# Patient Record
Sex: FEMALE | Race: WHITE | URBAN - METROPOLITAN AREA
[De-identification: names, ages, dates, MRNs, and addresses within clinical notes are randomized per-mention and may not be internally consistent; named-entity substitution may affect disease eponyms.]

---

## 2021-11-20 ENCOUNTER — EMERGENCY (EMERGENCY)
Facility: HOSPITAL | Age: 76
LOS: 0 days | Discharge: HOME | End: 2021-11-20
Attending: STUDENT IN AN ORGANIZED HEALTH CARE EDUCATION/TRAINING PROGRAM
Payer: MEDICARE

## 2021-11-20 VITALS
RESPIRATION RATE: 17 BRPM | SYSTOLIC BLOOD PRESSURE: 151 MMHG | TEMPERATURE: 97 F | HEART RATE: 70 BPM | DIASTOLIC BLOOD PRESSURE: 70 MMHG | OXYGEN SATURATION: 100 %

## 2021-11-20 VITALS — WEIGHT: 169.98 LBS | HEIGHT: 66 IN

## 2021-11-20 DIAGNOSIS — Z88.8 ALLERGY STATUS TO OTHER DRUGS, MEDICAMENTS AND BIOLOGICAL SUBSTANCES STATUS: ICD-10-CM
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
## 2021-11-20 DIAGNOSIS — T78.40XA ALLERGY, UNSPECIFIED, INITIAL ENCOUNTER: ICD-10-CM
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
## 2021-11-20 DIAGNOSIS — E78.5 HYPERLIPIDEMIA, UNSPECIFIED: ICD-10-CM

## 2021-11-20 DIAGNOSIS — R55 SYNCOPE AND COLLAPSE: ICD-10-CM

## 2021-11-20 DIAGNOSIS — I10 ESSENTIAL (PRIMARY) HYPERTENSION: ICD-10-CM

## 2021-11-20 LAB
ALBUMIN SERPL ELPH-MCNC: 4 G/DL — SIGNIFICANT CHANGE UP (ref 3.3–5)
ALP SERPL-CCNC: 83 U/L — SIGNIFICANT CHANGE UP (ref 40–120)
ALT FLD-CCNC: 34 U/L — SIGNIFICANT CHANGE UP (ref 12–78)
ANION GAP SERPL CALC-SCNC: 6 MMOL/L — SIGNIFICANT CHANGE UP (ref 5–17)
AST SERPL-CCNC: 30 U/L — SIGNIFICANT CHANGE UP (ref 15–37)
BASOPHILS # BLD AUTO: 0.04 K/UL — SIGNIFICANT CHANGE UP (ref 0–0.2)
BASOPHILS NFR BLD AUTO: 0.5 % — SIGNIFICANT CHANGE UP (ref 0–2)
BILIRUB SERPL-MCNC: 0.7 MG/DL — SIGNIFICANT CHANGE UP (ref 0.2–1.2)
BUN SERPL-MCNC: 16 MG/DL — SIGNIFICANT CHANGE UP (ref 7–23)
CALCIUM SERPL-MCNC: 9.6 MG/DL — SIGNIFICANT CHANGE UP (ref 8.5–10.1)
CHLORIDE SERPL-SCNC: 105 MMOL/L — SIGNIFICANT CHANGE UP (ref 96–108)
CO2 SERPL-SCNC: 24 MMOL/L — SIGNIFICANT CHANGE UP (ref 22–31)
CREAT SERPL-MCNC: 0.65 MG/DL — SIGNIFICANT CHANGE UP (ref 0.5–1.3)
EOSINOPHIL # BLD AUTO: 0.27 K/UL — SIGNIFICANT CHANGE UP (ref 0–0.5)
EOSINOPHIL NFR BLD AUTO: 3.2 % — SIGNIFICANT CHANGE UP (ref 0–6)
GLUCOSE SERPL-MCNC: 100 MG/DL — HIGH (ref 70–99)
HCT VFR BLD CALC: 41.1 % — SIGNIFICANT CHANGE UP (ref 34.5–45)
HGB BLD-MCNC: 14.1 G/DL — SIGNIFICANT CHANGE UP (ref 11.5–15.5)
IMM GRANULOCYTES NFR BLD AUTO: 0.2 % — SIGNIFICANT CHANGE UP (ref 0–1.5)
LYMPHOCYTES # BLD AUTO: 1.51 K/UL — SIGNIFICANT CHANGE UP (ref 1–3.3)
LYMPHOCYTES # BLD AUTO: 17.8 % — SIGNIFICANT CHANGE UP (ref 13–44)
MCHC RBC-ENTMCNC: 30.3 PG — SIGNIFICANT CHANGE UP (ref 27–34)
MCHC RBC-ENTMCNC: 34.3 GM/DL — SIGNIFICANT CHANGE UP (ref 32–36)
MCV RBC AUTO: 88.4 FL — SIGNIFICANT CHANGE UP (ref 80–100)
MONOCYTES # BLD AUTO: 0.67 K/UL — SIGNIFICANT CHANGE UP (ref 0–0.9)
MONOCYTES NFR BLD AUTO: 7.9 % — SIGNIFICANT CHANGE UP (ref 2–14)
NEUTROPHILS # BLD AUTO: 5.99 K/UL — SIGNIFICANT CHANGE UP (ref 1.8–7.4)
NEUTROPHILS NFR BLD AUTO: 70.4 % — SIGNIFICANT CHANGE UP (ref 43–77)
PLATELET # BLD AUTO: 256 K/UL — SIGNIFICANT CHANGE UP (ref 150–400)
POTASSIUM SERPL-MCNC: 3.6 MMOL/L — SIGNIFICANT CHANGE UP (ref 3.5–5.3)
POTASSIUM SERPL-SCNC: 3.6 MMOL/L — SIGNIFICANT CHANGE UP (ref 3.5–5.3)
PROT SERPL-MCNC: 7.2 GM/DL — SIGNIFICANT CHANGE UP (ref 6–8.3)
RBC # BLD: 4.65 M/UL — SIGNIFICANT CHANGE UP (ref 3.8–5.2)
RBC # FLD: 12.5 % — SIGNIFICANT CHANGE UP (ref 10.3–14.5)
SODIUM SERPL-SCNC: 135 MMOL/L — SIGNIFICANT CHANGE UP (ref 135–145)
TROPONIN I, HIGH SENSITIVITY RESULT: 7.53 NG/L — SIGNIFICANT CHANGE UP
WBC # BLD: 8.5 K/UL — SIGNIFICANT CHANGE UP (ref 3.8–10.5)
WBC # FLD AUTO: 8.5 K/UL — SIGNIFICANT CHANGE UP (ref 3.8–10.5)

## 2021-11-20 PROCEDURE — 99283 EMERGENCY DEPT VISIT LOW MDM: CPT

## 2021-11-20 PROCEDURE — 80053 COMPREHEN METABOLIC PANEL: CPT

## 2021-11-20 PROCEDURE — 85025 COMPLETE CBC W/AUTO DIFF WBC: CPT

## 2021-11-20 PROCEDURE — 93005 ELECTROCARDIOGRAM TRACING: CPT

## 2021-11-20 PROCEDURE — 93010 ELECTROCARDIOGRAM REPORT: CPT

## 2021-11-20 PROCEDURE — 84484 ASSAY OF TROPONIN QUANT: CPT

## 2021-11-20 PROCEDURE — 99285 EMERGENCY DEPT VISIT HI MDM: CPT

## 2021-11-20 PROCEDURE — 36415 COLL VENOUS BLD VENIPUNCTURE: CPT

## 2021-11-20 NOTE — ED STATDOCS - PATIENT PORTAL LINK FT
You can access the FollowMyHealth Patient Portal offered by [facility] by registering at the following website: [email or web address]. By joining [employer]’s FollowMyHealth portal, you will also be able to view your health information using other applications (apps) compatible with our system.

## 2021-11-20 NOTE — ED STATDOCS - NSFOLLOWUPINSTRUCTIONS_ED_ALL_ED_FT
GENERAL ALLERGIC REACTION - Ambulatory Care           General Allergic Reaction    AMBULATORY CARE:    An allergic reaction is your body's response to an allergen. Allergens include medicines, food, insect stings, animal dander, mold, latex, chemicals, and dust mites. Pollen from trees, grass, and weeds can also cause an allergic reaction. An allergic reaction can range from mild to severe.    Common signs and symptoms:   •Sneezing and a runny, itchy, or stuffy nose      •Swollen, watery, or itchy eyes      •Mild or severe skin itching or swelling      •Swelling or pain where an insect bit or stung you      •Trouble breathing or swallowing, a cough, or wheezing      •A rash or hives      •Feeling lightheaded or dizzy      Call 911 for signs or symptoms of anaphylaxis, such as trouble breathing, swelling in your mouth or throat, or wheezing. You may also have itching, a rash, hives, or feel like you are going to faint.    Seek care immediately if:   •You have a skin rash, hives, swelling, or itching that is starting to get worse.      •Your throat tightens, or your lips or tongue swell.      •You have trouble swallowing or speaking.      •You have worsening nausea, diarrhea, or abdominal cramps, or you are vomiting.      •You have chest pain or tightness.      Contact your healthcare provider if:   •You have questions or concerns about your condition or care.          Treatment for a general allergic reaction may include medicines to relieve certain allergy symptoms such as itching, sneezing, and swelling. You may take them as a pill or use drops in your nose or eyes. Topical treatments may be given to put directly on your skin to help decrease itching or swelling. Epinephrine may be prescribed if you are at risk for anaphylaxis. This is a severe allergic reaction that can be life-threatening. Your healthcare provider will tell you if you need to keep epinephrine with you. You will be taught when and how to use it.    Manage your symptoms:   •Avoid allergens. You may need to have allergy testing with your healthcare provider or a specialist to find your allergens.      •Use cold compresses on your skin or eyes. This will help soothe skin or eyes affected by the allergic reaction. You can make a cold compress by soaking a washcloth in cool water. Wring out the extra water before you apply the washcloth.      •Rinse your nasal passages with a saline solution. Daily rinsing may help clear allergens out of your nose. Use distilled water if possible. You can also boil tap water and then let it cool before you use it. Do not use tap water without boiling it first.      •Do not smoke. Nicotine and other chemicals in cigarettes and cigars can make an allergic reaction worse, and can also cause lung damage. Ask your healthcare provider for information if you currently smoke and need help to quit. E-cigarettes or smokeless tobacco still contain nicotine. Talk to your healthcare provider before you use these products.       Follow up with your doctor as directed: Write down your questions so you remember to ask them during your visits.

## 2021-11-20 NOTE — ED STATDOCS - ATTENDING CONTRIBUTION TO CARE
I, Gina Amaral DO,  performed the initial face to face bedside interview with this patient regarding history of present illness, review of symptoms and relevant past medical, social and family history.  I completed an independent physical examination.  I was the initial provider who evaluated this patient. I have signed out the follow up of any pending tests (i.e. labs, radiological studies) to the ACP.  I have communicated the patient’s plan of care and disposition with the ACP.  The history, relevant review of systems, past medical and surgical history, medical decision making, and physical examination was documented by the scribe in my presence and I attest to the accuracy of the documentation.

## 2021-11-20 NOTE — ED ADULT NURSE NOTE - OBJECTIVE STATEMENT
pt presents to the ED for allergic reaction. Pt reports she went out to dinner last night and after she had a burning sensation throughout her body. Pt states she lips and tongue were swollen. Pt became diaphoretic and thinks she passed out but is not sure. Pt had a HA at that time, now improved. Pt reports she had a burning sensation throughout her body a month ago after going out to dinner. Pt currently feels back to baseline.

## 2021-11-20 NOTE — ED STATDOCS - PROGRESS NOTE DETAILS
PA: Patient is a 77 y/o female with PMHx of HTN, HLD who presents to [facility] for possible allergic reaction. Pt reports she went out to dinner last night and after she had a burning sensation throughout her body. Pt states she lips and tongue were swollen. Pt became diaphoretic and thinks she passed out but she is not sure. Pt had a HA at that time, now improved. Pt reports she had a burning sensation throughout her body a month ago after going out to dinner. Pt currently feels back to baseline. Denies rash. No other complaints at this time. ~Kristian Lee PA-C PA note: All labwork and studies reviewed in details with patient. Patient re-examined and re-evaluated. Patient feels much better at this time. ED evaluation, Diagnosis and management discussed with the patient in detail. Workup results discussed with ED attending, OK to [place] home. Close PMD follow up encouraged.  Strict ED return instructions discussed in detail and patient given the opportunity to ask any questions about their discharge diagnosis and instructions. Patient verbalized understanding. ~ Kristian Lee PA-C

## 2021-11-20 NOTE — ED STATDOCS - OBJECTIVE STATEMENT
77 y/o female with a PMHx of HTN, HLD presents to the ED for allergic reaction. Pt reports she went out to dinner last night and after she had a burning sensation throughout her body. Pt states she lips and tongue were swollen. Pt became diaphoretic and thinks she passed out but is not sure. Pt had a HA at that time, now improved. Pt reports she had a burning sensation throughout her body a month ago after going out to dinner. Pt currently feels back to baseline. Denies rash. No other complaints at this time.

## 2021-11-20 NOTE — ED STATDOCS - CLINICAL SUMMARY MEDICAL DECISION MAKING FREE TEXT BOX
75 y/o female with dizziness and syncope s/p allergic reaction. Plan: EKG, basic labs, reeval. 77 y/o female with dizziness and syncope s/p allergic reaction. Plan: EKG, basic labs, reeval.    PA note: All labwork and studies reviewed in details with patient. Patient re-examined and re-evaluated. Patient feels much better at this time. ED evaluation, Diagnosis and management discussed with the patient in detail. Workup results discussed with ED attending, OK to [place] home. Close PMD follow up encouraged.  Strict ED return instructions discussed in detail and patient given the opportunity to ask any questions about their discharge diagnosis and instructions. Patient verbalized understanding. ~ Kristian Lee PA-C

## 2021-11-20 NOTE — ED STATDOCS - PHYSICAL EXAMINATION
PA NOTE: GEN: AOX3, NAD. HEENT: Throat clear. Airway is patent. EYES: PERRLA. EOMI. Head: NC/AT. NECK: Supple, No JVD. FROM. C-spine non-tender. CV:S1S2, RRR, LUNGS: Non-labored breathing, no tachypnea. O2sat 100% RA. CTA b/l. No w/r/r. CHEST: Equal chest expansion and rise. No deformity. ABD: Soft, NT/ND, no rebound, no guarding. No CVAT. EXT: No e/c/c. 2+ distal pulses. SKIN: No rashes. NEURO: No focal deficits. CN II-XII intact. FROM. 5/5 motor and sensory. ~Kristian Lee PA-C

## 2021-11-20 NOTE — ED ADULT TRIAGE NOTE - CHIEF COMPLAINT QUOTE
Pt presents to er with complaints of eating something last night and having an allergic reaction, states it is the second time this has happened in the past month and states she ate out both times, pt reports feeling dizzy today, states last night her tongue swelled up and was diaphoretic  and feels better at this time, pt speaking in clear/full sentences and ambulating with steady gait, respirations unlabored, pulse-[id number] in pivot.

## 2021-12-15 PROBLEM — I10 ESSENTIAL (PRIMARY) HYPERTENSION: Chronic | Status: ACTIVE | Noted: 2021-11-20

## 2021-12-15 PROBLEM — E78.5 HYPERLIPIDEMIA, UNSPECIFIED: Chronic | Status: ACTIVE | Noted: 2021-11-20

## 2022-02-03 ENCOUNTER — APPOINTMENT (OUTPATIENT)
Dept: PEDIATRIC ALLERGY IMMUNOLOGY | Facility: CLINIC | Age: 77
End: 2022-02-03
Payer: MEDICARE

## 2022-02-03 VITALS — OXYGEN SATURATION: 100 % | TEMPERATURE: 97.4 F | RESPIRATION RATE: 16 BRPM | HEART RATE: 63 BPM

## 2022-02-03 PROCEDURE — 99205 OFFICE O/P NEW HI 60 MIN: CPT

## 2022-02-03 RX ORDER — OMEPRAZOLE 20 MG/1
20 CAPSULE, DELAYED RELEASE ORAL
Refills: 0 | Status: ACTIVE | COMMUNITY

## 2022-02-03 RX ORDER — EPINEPHRINE 0.3 MG/.3ML
0.3 INJECTION INTRAMUSCULAR
Qty: 2 | Refills: 2 | Status: ACTIVE | COMMUNITY
Start: 2022-02-03 | End: 1900-01-01

## 2022-02-03 RX ORDER — CHOLECALCIFEROL (VITAMIN D3) 25 MCG
TABLET ORAL
Refills: 0 | Status: ACTIVE | COMMUNITY

## 2022-02-03 RX ORDER — LOSARTAN POTASSIUM AND HYDROCHLOROTHIAZIDE 25; 100 MG/1; MG/1
100-25 TABLET ORAL
Refills: 0 | Status: ACTIVE | COMMUNITY

## 2022-02-03 RX ORDER — CALCIUM CITRATE/VITAMIN D3 200MG-6.25
TABLET ORAL
Refills: 0 | Status: ACTIVE | COMMUNITY

## 2022-02-03 NOTE — PHYSICAL EXAM
[Alert] : alert [Well Nourished] : well nourished [No Discharge] : no discharge [Normal TMs] : both tympanic membranes were normal [No Thrush] : no thrush [Boggy Nasal Turbinates] : no boggy and/or pale nasal turbinates [Posterior Pharyngeal Cobblestoning] : no posterior pharyngeal cobblestoning [No Neck Mass] : no neck mass was observed [Normal Rate and Effort] : normal respiratory rhythm and effort [Wheezing] : no wheezing was heard [Normal Rate] : heart rate was normal  [Normal S1, S2] : normal S1 and S2 [Soft] : abdomen soft [Normal Cervical Lymph Nodes] : cervical [Skin Intact] : skin intact

## 2022-02-03 NOTE — REASON FOR VISIT
[Initial Evaluation] : an initial evaluation of [Allergy Evaluation/ Skin Testing] : allergy evaluation and or skin testing

## 2022-02-03 NOTE — SOCIAL HISTORY
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

## 2022-02-03 NOTE — HISTORY OF PRESENT ILLNESS
[Asthma] : asthma [Allergic Rhinitis] : allergic rhinitis [Eczematous rashes] : eczematous rashes [Food Allergies] : food allergies [de-identified] : 76 yr old with new onset two episode of anaphylaxis.  \par On 10/22 about 1 hr after eating a meal at restaurant of cheese omelet, sweet tea, sweet potato fries, cheese cake and whipped cream she noted onset of diffuse itching, red itchy palms feet itching and diffuse erythema - she took NO H1 antihistamines and did not go to ER - she used Sarna lotion with decrease complaints over next hour\par \par She then on 11/19 at at a different restaurant - mac and cheese, chicken and sweet tea. About 10 min after meal more severe complaints of burning, itchy palm, tongue welling, eye lid edema, headache, sweating and ?? LOC.  She never called 911, never took any meds and fell asleep and woke up OK and then went to ER where she was fine.  \par \par she has since consumed wheat, milk sweet potatoes, chicken, sweet tea, eggs on her own and has been fine.\par \par She does not yet have ingredient list from restaurant

## 2022-02-03 NOTE — ASSESSMENT
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 - pt has consumed many of those foods or all of those foods that were consumed those days at home\par \par One common ingredient is the cheese which may have annatto in it - pt will try to get labels from restaurant and look at home to see if she has consumed annatto in cheeses she eats at home since these events\par \par Gave pt Epi pen and instructed in ashlee\par \par Will send anaphylaxis evaluation looking for mast cell disease and screening ImmunoCAP.\par \par Pt will try to find ingredient list from restaurant and get back to me\par \par Total MD time spent on this encounter was 60 minutes.  This includes time devoted to preparing to see the patient with review of previous medical record, obtaining medical history, performing physical exam, counseling and patient education with patient and family, ordering medications and lab studies, documentation in the medical record and coordination of care.\par

## 2022-02-16 ENCOUNTER — LABORATORY RESULT (OUTPATIENT)
Age: 77
End: 2022-02-16

## 2022-02-17 LAB
ALBUMIN SERPL ELPH-MCNC: 4.6 G/DL
ALP BLD-CCNC: 81 U/L
ALT SERPL-CCNC: 15 U/L
ANION GAP SERPL CALC-SCNC: 15 MMOL/L
AST SERPL-CCNC: 20 U/L
BASOPHILS # BLD AUTO: 0.07 K/UL
BASOPHILS NFR BLD AUTO: 1 %
BILIRUB SERPL-MCNC: 0.7 MG/DL
BUN SERPL-MCNC: 11 MG/DL
CALCIUM SERPL-MCNC: 10.5 MG/DL
CHLORIDE SERPL-SCNC: 101 MMOL/L
CO2 SERPL-SCNC: 22 MMOL/L
CREAT SERPL-MCNC: 0.7 MG/DL
EOSINOPHIL # BLD AUTO: 0.32 K/UL
EOSINOPHIL NFR BLD AUTO: 4.8 %
ERYTHROCYTE [SEDIMENTATION RATE] IN BLOOD BY WESTERGREN METHOD: 14 MM/HR
GLUCOSE SERPL-MCNC: 74 MG/DL
HCT VFR BLD CALC: 42.4 %
HGB BLD-MCNC: 13.7 G/DL
IMM GRANULOCYTES NFR BLD AUTO: 0.3 %
LYMPHOCYTES # BLD AUTO: 1.42 K/UL
LYMPHOCYTES NFR BLD AUTO: 21.2 %
MAN DIFF?: NORMAL
MCHC RBC-ENTMCNC: 30 PG
MCHC RBC-ENTMCNC: 32.3 GM/DL
MCV RBC AUTO: 92.8 FL
MONOCYTES # BLD AUTO: 0.55 K/UL
MONOCYTES NFR BLD AUTO: 8.2 %
NEUTROPHILS # BLD AUTO: 4.31 K/UL
NEUTROPHILS NFR BLD AUTO: 64.5 %
PLATELET # BLD AUTO: 271 K/UL
POTASSIUM SERPL-SCNC: 3.8 MMOL/L
PROT SERPL-MCNC: 6.7 G/DL
RBC # BLD: 4.57 M/UL
RBC # FLD: 13.1 %
SODIUM SERPL-SCNC: 138 MMOL/L
T4 SERPL-MCNC: 6 UG/DL
THYROGLOB AB SERPL-ACNC: <20 IU/ML
THYROPEROXIDASE AB SERPL IA-ACNC: 12.8 IU/ML
TSH SERPL-ACNC: 0.17 UIU/ML
WBC # FLD AUTO: 6.69 K/UL

## 2022-02-19 ENCOUNTER — NON-APPOINTMENT (OUTPATIENT)
Age: 77
End: 2022-02-19

## 2022-02-19 LAB
ALMOND IGE QN: <0.1 KUA/L
ANA SER IF-ACNC: NEGATIVE
BEEF IGE QN: <0.1 KUA/L
CASHEW NUT IGE QN: <0.1 KUA/L
CHEDDAR IGE QN: <0.1 KUA/L
CLAM IGE QN: <0.1 KUA/L
CORN IGE QN: <0.1 KUA/L
CRAB IGE QN: <0.1 KUA/L
DEPRECATED ALMOND IGE RAST QL: 0
DEPRECATED BEEF IGE RAST QL: 0
DEPRECATED CASHEW NUT IGE RAST QL: 0
DEPRECATED CHEDDAR IGE RAST QL: 0
DEPRECATED CLAM IGE RAST QL: 0
DEPRECATED CORN IGE RAST QL: 0
DEPRECATED CRAB IGE RAST QL: 0
DEPRECATED EGG WHITE IGE RAST QL: 0
DEPRECATED EGG YOLK IGE RAST QL: 0
DEPRECATED FLOUNDER IGE RAST QL: 0
DEPRECATED GARLIC IGE RAST QL: <0.1 KUA/L
DEPRECATED LOBSTER IGE RAST QL: 0
DEPRECATED MUSTARD IGE RAST QL: <0.1 KUA/L
DEPRECATED OAT IGE RAST QL: 0
DEPRECATED ONION IGE RAST QL: 0
DEPRECATED PEANUT IGE RAST QL: 0
DEPRECATED PISTACHIO IGE RAST QL: <0.1 KUA/L
DEPRECATED PORK IGE RAST QL: 0
DEPRECATED SALMON IGE RAST QL: 0
DEPRECATED SCALLOP IGE RAST QL: <0.1 KUA/L
DEPRECATED SESAME SEED IGE RAST QL: 0
DEPRECATED SHRIMP IGE RAST QL: 0
DEPRECATED SOYBEAN IGE RAST QL: 0
DEPRECATED SUNFLOWER SEED IGE RAST QL: 0
DEPRECATED SWEET POTATO IGE RAST QL: 0
DEPRECATED WALNUT IGE RAST QL: 0
E ANA O3 STORAGE PROTEIN CASHEW (F443) CLASS: 0
E ANA O3 STORAGE PROTEIN CASHEW (F443) CONC: <0.1 KUA/L
EGG WHITE IGE QN: <0.1 KUA/L
EGG YOLK IGE QN: <0.1 KUA/L
FLOUNDER IGE QN: <0.1 KUA/L
GALACTOSE, ALPHA (O215) CONC: <0.1 KUA/L
GALACTOSE-ALPHA-1,3-GALACTOSE IGE: 0
GARLIC IGE QN: 0
LINSEED (RF333) CLASS: 0
LINSEED (RF333) CONC: <0.1 KUA/L
LOBSTER IGE QN: <0.1 KUA/L
MUSTARD IGE QN: 0
OAT IGE QN: <0.1 KUA/L
ONION IGE QN: <0.1 KUA/L
PEANUT (RARA H) 1 IGE QN: <0.1 KUA/L
PEANUT (RARA H) 2 IGE QN: <0.1 KUA/L
PEANUT (RARA H) 3 IGE QN: <0.1 KUA/L
PEANUT (RARA H) 6 IGE QN: <0.1 KUA/L
PEANUT (RARA H) 8 IGE QN: <0.1 KUA/L
PEANUT (RARA H) 9 IGE QN: <0.1 KUA/L
PEANUT IGE QN: <0.1 KUA/L
PISTACHIO IGE QN: 0
PORK IGE QN: <0.1 KUA/L
R JUG R1 STORAGE PROTEIN WALNUT (F441) CLASS: 0
R JUG R1 STORAGE PROTEIN WALNUT (F441) CONC: <0.1 KUA/L
R JUG R3 LPT WALNUT (F442) CLASS: 0
R JUG R3 LPT WALNUT (F442) CONC: <0.1 KUA/L
RARA H 6 STORAGE PROTEIN (F447) CLASS: 0
RARA H1 STORAGE PROTEIN (F422) CLASS: 0
RARA H2 STORAGE PROTEIN (F423) CLASS: 0
RARA H3 STORAGE PROTEIN (F424) CLASS: 0
RARA H8 PR-10 PROTEIN (F352) CLASS: 0
RARA H9 LIPID TRANSFERTP (F427) CLASS: 0
SALMON IGE QN: <0.1 KUA/L
SCALLOP IGE QN: 0
SCALLOP IGE QN: <0.1 KUA/L
SESAME SEED IGE QN: <0.1 KUA/L
SOY NGLY M5 BETA CONGLYCININ IGE F431 CLASS: 0
SOY NGLY M5 BETA CONGLYCININ IGE F431 CONC: <0.1 KUA/L
SOY NGLY M6 GLYCININ IGE F432 CLASS: 0
SOY NGLY M6 GLYCININ IGE F432 CONC: <0.1 KUA/L
SOY RGLY M4 PR-10 IGE F353 CLASS: 0
SOY RGLY M4 PR-10 IGE F353 CONC: <0.1 KUA/L
SOYBEAN IGE QN: <0.1 KUA/L
SUNFLOWER SEED IGE QN: <0.1 KUA/L
SWEET POTATO IGE QN: <0.1 KUA/L
TRYPTASE: 22.9 UG/L
WALNUT IGE QN: <0.1 KUA/L

## 2022-02-24 LAB
2,3-DINOR 11B-PROSTAGLANDIN F2A, 24 HR URINE: <803 CD:455662145
COLLECT DURATION TIME UR: 24 H
CREATININE, 24 HOUR URINE: 527 MG/24 H
SPECIMEN VOL 24H UR: 1350 ML

## 2022-02-27 ENCOUNTER — NON-APPOINTMENT (OUTPATIENT)
Age: 77
End: 2022-02-27

## 2022-02-27 LAB — CHRONIC URTICARIA PANEL (CU INDEX): 1.6

## 2022-03-01 ENCOUNTER — APPOINTMENT (OUTPATIENT)
Dept: PEDIATRIC ALLERGY IMMUNOLOGY | Facility: CLINIC | Age: 77
End: 2022-03-01
Payer: MEDICARE

## 2022-03-01 VITALS — OXYGEN SATURATION: 100 % | RESPIRATION RATE: 16 BRPM | TEMPERATURE: 97.3 F | HEART RATE: 60 BPM

## 2022-03-01 DIAGNOSIS — T78.1XXA OTHER ADVERSE FOOD REACTIONS, NOT ELSEWHERE CLASSIFIED, INITIAL ENCOUNTER: ICD-10-CM

## 2022-03-01 DIAGNOSIS — T78.2XXA ANAPHYLACTIC SHOCK, UNSPECIFIED, INITIAL ENCOUNTER: ICD-10-CM

## 2022-03-01 PROCEDURE — 99214 OFFICE O/P EST MOD 30 MIN: CPT

## 2022-03-02 LAB — LEUKOTRIENE E4, URINE: NORMAL

## 2022-03-02 NOTE — HISTORY OF PRESENT ILLNESS
[Asthma] : asthma [Allergic Rhinitis] : allergic rhinitis [Eczematous rashes] : eczematous rashes [Food Allergies] : food allergies [de-identified] : 76 yr old with new onset two episode of anaphylaxis.  \par On 10/22 about 1 hr after eating a meal at restaurant of cheese omelet, sweet tea, sweet potato fries, cheese cake and whipped cream she noted onset of diffuse itching, red itchy palms feet itching and diffuse erythema - she took NO H1 antihistamines and did not go to ER - she used Sarna lotion with decrease complaints over next hour\par \par She then on 11/19 at at a different restaurant - mac and cheese, chicken and sweet tea. About 10 min after meal more severe complaints of burning, itchy palm, tongue welling, eye lid edema, headache, sweating and ?? LOC.  She never called 911, never took any meds and fell asleep and woke up OK and then went to ER where she was fine.  \par \par she has since consumed wheat, milk sweet potatoes, chicken, sweet tea, eggs on her own and has been fine.\par \par She does not yet have ingredient list from restaurant\par \par She is now here for follow up  - no more episodes of anaphylaxis - pt now carries Epi Pen\par All laboratory studies have returned. Tryptase is elevated at 22.9 - 24 hr urine for [id number] however is negative - LTE4 is pending.\par All other lab studies are normal.\par Pt has no history of urticaria pigmentosa and has no other signs of mastocytosis other than these post prandial anaphylactic events\par She has not yet checked ingredient list of foods and has since consumed Annatto and has been OK \par No family history of systemic allergic reactions or mast cell disease\par No history of bee sting allergy\par No history of abdominal pain, diarrhea, headaches\par Mild GERD on omeprazole\par All food ImmunoCAP negative for ingredients consumed on those meals that patient is aware of - pt will obtain ingredient lists for me

## 2022-03-02 NOTE — ASSESSMENT
[FreeTextEntry1] : 76 yr old with two recent episodes of post prandial anaphylaxis but no common food denominator, all negative Immunocaps - only common denominator may have been annatto and the patient consume all of these\par \par Tryptase significant elevated at 22 - ?? indolent mastocytosis\par \par ?? Heriditary tryptasemia - can consider Gene by Gene Screening for TPSAB1\par \par Will obtain c-kit and repeat Tryptase level\par \par Awaiting 24 hr urine for LTE4 - all other tests negative\par No UP\par \par Hold on starting chronic H1 therapy for now but it c-kit positive - will start\par \par Total MD time spent on this encounter was 40 minutes.  This includes time devoted to preparing to see the patient with review of previous medical record, obtaining medical history, performing physical exam, counseling and patient education with patient and family, ordering medications and lab studies, documentation in the medical record and coordination of care.\par \par \par

## 2022-03-02 NOTE — PHYSICAL EXAM
[Alert] : alert [Well Nourished] : well nourished [Normal Pupil & Iris Size/Symmetry] : normal pupil and iris size and symmetry [No Discharge] : no discharge [Normal TMs] : both tympanic membranes were normal [Normal Nasal Mucosa] : the nasal mucosa was normal [No Neck Mass] : no neck mass was observed [Normal Rate and Effort] : normal respiratory rhythm and effort [Normal S1, S2] : normal S1 and S2 [Normal Cervical Lymph Nodes] : cervical [Skin Intact] : skin intact  [Wheezing] : no wheezing was heard [de-identified] : No urticaria pigmentosa

## 2022-03-02 NOTE — SOCIAL HISTORY
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

## 2022-03-02 NOTE — ADDENDUM
[FreeTextEntry1] : Spot urine for LTE4 is elevated - ?? consistent with SM or MCAD - awaiting c-kit mutation

## 2022-03-05 ENCOUNTER — NON-APPOINTMENT (OUTPATIENT)
Age: 77
End: 2022-03-05

## 2022-03-05 LAB — TRYPTASE: 21.3 UG/L

## 2022-03-06 LAB — TOTAL IGE SMQN RAST: 13 KU/L

## 2022-03-10 LAB
C-KIT BACKGROUND: NORMAL
C-KIT DIRECTOR: NORMAL
C-KIT DISCLAIMER: NORMAL
C-KIT METHODOLOGY: NORMAL
C-KIT MUTATION ANALYSIS RESULT: NORMAL
C-KIT REFERENCE: NORMAL

## 2022-04-12 ENCOUNTER — NON-APPOINTMENT (OUTPATIENT)
Age: 77
End: 2022-04-12

## 2022-04-14 ENCOUNTER — NON-APPOINTMENT (OUTPATIENT)
Age: 77
End: 2022-04-14

## 2023-10-03 ENCOUNTER — EMERGENCY (EMERGENCY)
Facility: HOSPITAL | Age: 78
LOS: 0 days | Discharge: HOME | End: 2023-10-03
Attending: STUDENT IN AN ORGANIZED HEALTH CARE EDUCATION/TRAINING PROGRAM
Payer: MEDICARE

## 2023-10-03 VITALS
OXYGEN SATURATION: 100 % | RESPIRATION RATE: 18 BRPM | DIASTOLIC BLOOD PRESSURE: 64 MMHG | SYSTOLIC BLOOD PRESSURE: 137 MMHG | TEMPERATURE: 98 F | HEART RATE: 68 BPM

## 2023-10-03 VITALS
OXYGEN SATURATION: 98 % | SYSTOLIC BLOOD PRESSURE: 128 MMHG | TEMPERATURE: 98 F | DIASTOLIC BLOOD PRESSURE: 78 MMHG | HEART RATE: 74 BPM | RESPIRATION RATE: 16 BRPM

## 2023-10-03 DIAGNOSIS — R68.84 JAW PAIN: ICD-10-CM

## 2023-10-03 DIAGNOSIS — N39.0 URINARY TRACT INFECTION, SITE NOT SPECIFIED: ICD-10-CM

## 2023-10-03 DIAGNOSIS — R55 SYNCOPE AND COLLAPSE: ICD-10-CM

## 2023-10-03 DIAGNOSIS — R10.9 UNSPECIFIED ABDOMINAL PAIN: ICD-10-CM

## 2023-10-03 DIAGNOSIS — Z88.5 ALLERGY STATUS TO NARCOTIC AGENT: ICD-10-CM

## 2023-10-03 DIAGNOSIS — I10 ESSENTIAL (PRIMARY) HYPERTENSION: ICD-10-CM

## 2023-10-03 DIAGNOSIS — H92.03 OTALGIA, BILATERAL: ICD-10-CM

## 2023-10-03 DIAGNOSIS — R61 GENERALIZED HYPERHIDROSIS: ICD-10-CM

## 2023-10-03 DIAGNOSIS — E78.5 HYPERLIPIDEMIA, UNSPECIFIED: ICD-10-CM

## 2023-10-03 DIAGNOSIS — R51.9 HEADACHE, UNSPECIFIED: ICD-10-CM

## 2023-10-03 DIAGNOSIS — K21.9 GASTRO-ESOPHAGEAL REFLUX DISEASE WITHOUT ESOPHAGITIS: ICD-10-CM

## 2023-10-03 LAB
ALBUMIN SERPL ELPH-MCNC: 3.9 G/DL — SIGNIFICANT CHANGE UP (ref 3.3–5)
ALP SERPL-CCNC: 106 U/L — SIGNIFICANT CHANGE UP (ref 40–120)
ALT FLD-CCNC: 39 U/L — SIGNIFICANT CHANGE UP (ref 12–78)
ANION GAP SERPL CALC-SCNC: 2 MMOL/L — LOW (ref 5–17)
APPEARANCE UR: ABNORMAL
AST SERPL-CCNC: 35 U/L — SIGNIFICANT CHANGE UP (ref 15–37)
BACTERIA # UR AUTO: NEGATIVE /HPF — SIGNIFICANT CHANGE UP
BASOPHILS # BLD AUTO: 0.08 K/UL — SIGNIFICANT CHANGE UP (ref 0–0.2)
BASOPHILS NFR BLD AUTO: 0.7 % — SIGNIFICANT CHANGE UP (ref 0–2)
BILIRUB SERPL-MCNC: 0.6 MG/DL — SIGNIFICANT CHANGE UP (ref 0.2–1.2)
BILIRUB UR-MCNC: NEGATIVE — SIGNIFICANT CHANGE UP
BUN SERPL-MCNC: 18 MG/DL — SIGNIFICANT CHANGE UP (ref 7–23)
CALCIUM SERPL-MCNC: 9.8 MG/DL — SIGNIFICANT CHANGE UP (ref 8.5–10.1)
CHLORIDE SERPL-SCNC: 105 MMOL/L — SIGNIFICANT CHANGE UP (ref 96–108)
CO2 SERPL-SCNC: 29 MMOL/L — SIGNIFICANT CHANGE UP (ref 22–31)
COLOR SPEC: YELLOW — SIGNIFICANT CHANGE UP
COMMENT - URINE: SIGNIFICANT CHANGE UP
CREAT SERPL-MCNC: 0.88 MG/DL — SIGNIFICANT CHANGE UP (ref 0.5–1.3)
DIFF PNL FLD: NEGATIVE — SIGNIFICANT CHANGE UP
EGFR: 68 ML/MIN/1.73M2 — SIGNIFICANT CHANGE UP
EOSINOPHIL # BLD AUTO: 0.14 K/UL — SIGNIFICANT CHANGE UP (ref 0–0.5)
EOSINOPHIL NFR BLD AUTO: 1.2 % — SIGNIFICANT CHANGE UP (ref 0–6)
EPI CELLS # UR: PRESENT
GLUCOSE SERPL-MCNC: 105 MG/DL — HIGH (ref 70–99)
GLUCOSE UR QL: NEGATIVE MG/DL — SIGNIFICANT CHANGE UP
GRAN CASTS # UR COMP ASSIST: SIGNIFICANT CHANGE UP
HCT VFR BLD CALC: 44.4 % — SIGNIFICANT CHANGE UP (ref 34.5–45)
HGB BLD-MCNC: 15.3 G/DL — SIGNIFICANT CHANGE UP (ref 11.5–15.5)
HYALINE CASTS # UR AUTO: SIGNIFICANT CHANGE UP
IMM GRANULOCYTES NFR BLD AUTO: 1.3 % — HIGH (ref 0–0.9)
KETONES UR-MCNC: NEGATIVE MG/DL — SIGNIFICANT CHANGE UP
LEUKOCYTE ESTERASE UR-ACNC: ABNORMAL
LIDOCAIN IGE QN: 36 U/L — SIGNIFICANT CHANGE UP (ref 13–75)
LYMPHOCYTES # BLD AUTO: 16.8 % — SIGNIFICANT CHANGE UP (ref 13–44)
LYMPHOCYTES # BLD AUTO: 2.03 K/UL — SIGNIFICANT CHANGE UP (ref 1–3.3)
MAGNESIUM SERPL-MCNC: 2.3 MG/DL — SIGNIFICANT CHANGE UP (ref 1.6–2.6)
MCHC RBC-ENTMCNC: 30.5 PG — SIGNIFICANT CHANGE UP (ref 27–34)
MCHC RBC-ENTMCNC: 34.5 GM/DL — SIGNIFICANT CHANGE UP (ref 32–36)
MCV RBC AUTO: 88.6 FL — SIGNIFICANT CHANGE UP (ref 80–100)
MONOCYTES # BLD AUTO: 0.8 K/UL — SIGNIFICANT CHANGE UP (ref 0–0.9)
MONOCYTES NFR BLD AUTO: 6.6 % — SIGNIFICANT CHANGE UP (ref 2–14)
NEUTROPHILS # BLD AUTO: 8.85 K/UL — HIGH (ref 1.8–7.4)
NEUTROPHILS NFR BLD AUTO: 73.4 % — SIGNIFICANT CHANGE UP (ref 43–77)
NITRITE UR-MCNC: NEGATIVE — SIGNIFICANT CHANGE UP
NT-PROBNP SERPL-SCNC: 67 PG/ML — SIGNIFICANT CHANGE UP (ref 0–450)
PH UR: 7.5 — SIGNIFICANT CHANGE UP (ref 5–8)
PLATELET # BLD AUTO: 324 K/UL — SIGNIFICANT CHANGE UP (ref 150–400)
POTASSIUM SERPL-MCNC: 3.6 MMOL/L — SIGNIFICANT CHANGE UP (ref 3.5–5.3)
POTASSIUM SERPL-SCNC: 3.6 MMOL/L — SIGNIFICANT CHANGE UP (ref 3.5–5.3)
PROT SERPL-MCNC: 7.5 GM/DL — SIGNIFICANT CHANGE UP (ref 6–8.3)
PROT UR-MCNC: NEGATIVE MG/DL — SIGNIFICANT CHANGE UP
RBC # BLD: 5.01 M/UL — SIGNIFICANT CHANGE UP (ref 3.8–5.2)
RBC # FLD: 12.8 % — SIGNIFICANT CHANGE UP (ref 10.3–14.5)
RBC CASTS # UR COMP ASSIST: 3 /HPF — SIGNIFICANT CHANGE UP (ref 0–4)
SODIUM SERPL-SCNC: 136 MMOL/L — SIGNIFICANT CHANGE UP (ref 135–145)
SP GR SPEC: 1.01 — SIGNIFICANT CHANGE UP (ref 1–1.03)
SQUAMOUS # UR AUTO: 1 /HPF — SIGNIFICANT CHANGE UP (ref 0–5)
TROPONIN I, HIGH SENSITIVITY RESULT: 3.49 NG/L — SIGNIFICANT CHANGE UP
UROBILINOGEN FLD QL: 1 MG/DL — SIGNIFICANT CHANGE UP (ref 0.2–1)
WBC # BLD: 12.06 K/UL — HIGH (ref 3.8–10.5)
WBC # FLD AUTO: 12.06 K/UL — HIGH (ref 3.8–10.5)
WBC UR QL: 11 /HPF — HIGH (ref 0–5)

## 2023-10-03 PROCEDURE — 93010 ELECTROCARDIOGRAM REPORT: CPT

## 2023-10-03 PROCEDURE — 87086 URINE CULTURE/COLONY COUNT: CPT

## 2023-10-03 PROCEDURE — 81001 URINALYSIS AUTO W/SCOPE: CPT

## 2023-10-03 PROCEDURE — 80053 COMPREHEN METABOLIC PANEL: CPT

## 2023-10-03 PROCEDURE — 83690 ASSAY OF LIPASE: CPT

## 2023-10-03 PROCEDURE — 93005 ELECTROCARDIOGRAM TRACING: CPT

## 2023-10-03 PROCEDURE — 83735 ASSAY OF MAGNESIUM: CPT

## 2023-10-03 PROCEDURE — 71045 X-RAY EXAM CHEST 1 VIEW: CPT | Mod: 26

## 2023-10-03 PROCEDURE — 83880 ASSAY OF NATRIURETIC PEPTIDE: CPT

## 2023-10-03 PROCEDURE — 85025 COMPLETE CBC W/AUTO DIFF WBC: CPT

## 2023-10-03 PROCEDURE — 71045 X-RAY EXAM CHEST 1 VIEW: CPT

## 2023-10-03 PROCEDURE — 99285 EMERGENCY DEPT VISIT HI MDM: CPT | Mod: FS

## 2023-10-03 PROCEDURE — 36415 COLL VENOUS BLD VENIPUNCTURE: CPT

## 2023-10-03 PROCEDURE — 99285 EMERGENCY DEPT VISIT HI MDM: CPT | Mod: 25

## 2023-10-03 PROCEDURE — 84484 ASSAY OF TROPONIN QUANT: CPT

## 2023-10-03 RX ORDER — CEPHALEXIN 500 MG
500 CAPSULE ORAL ONCE
Refills: 0 | Status: COMPLETED | OUTPATIENT
Start: 2023-10-03 | End: 2023-10-03

## 2023-10-03 RX ORDER — CEPHALEXIN 500 MG
1 CAPSULE ORAL
Qty: 21 | Refills: 0
Start: 2023-10-03

## 2023-10-03 RX ORDER — SODIUM CHLORIDE 9 MG/ML
1000 INJECTION INTRAMUSCULAR; INTRAVENOUS; SUBCUTANEOUS ONCE
Refills: 0 | Status: COMPLETED | OUTPATIENT
Start: 2023-10-03 | End: 2023-10-03

## 2023-10-03 RX ADMIN — Medication 500 MILLIGRAM(S): at 21:29

## 2023-10-03 RX ADMIN — SODIUM CHLORIDE 1000 MILLILITER(S): 9 INJECTION INTRAMUSCULAR; INTRAVENOUS; SUBCUTANEOUS at 17:01

## 2023-10-03 NOTE — ED STATDOCS - ATTENDING APP SHARED VISIT CONTRIBUTION OF CARE
I, Neal Catalan DO, personally saw the patient with ACP.  I have personally performed a face to face diagnostic evaluation on this patient.  I have reviewed the ACP note and agree with the history, exam, and plan of care, except as noted.   The initial assessment was performed by myself and then the patient was handed off to the ACP. The patient was followed and re-evaluated by the ACP. All labs, imaging and procedures were evaluated and performed by the ACP and I was available for consultation if any questions in the patients care came up.

## 2023-10-03 NOTE — ED STATDOCS - PHYSICAL EXAMINATION
GENERAL: A&Ox4, non-toxic appearing, no acute distress  HEENT: NCAT, EOMI, oral mucosa moist, normal conjunctiva  RESP: CTAB, no respiratory distress, no wheezes/rhonchi/rales, speaking in full sentences  CV: RRR, no murmurs/rubs/gallops  ABDOMEN: soft, non-tender, non-distended, no guarding, no CVA tenderness  MSK: no visible deformities  NEURO: CN 2-12 grossly intact, no facial droop, no slurred speech, no pronator drift, equal , 5/5 strength in all extremities, SILT, PERRLA, no nystagmus, steady gait, -Rhomberg's  SKIN: warm, normal color, well perfused, no rash  PSYCH: normal affect Attending: GENERAL: A&Ox4, non-toxic appearing, no acute distress  HEENT: NCAT, EOMI, oral mucosa moist, normal conjunctiva  RESP: CTAB, no respiratory distress, no wheezes/rhonchi/rales, speaking in full sentences  CV: RRR, no murmurs/rubs/gallops  ABDOMEN: soft, non-tender, non-distended, no guarding, no CVA tenderness  MSK: no visible deformities  NEURO: CN 2-12 grossly intact, no facial droop, no slurred speech, no pronator drift, equal , 5/5 strength in all extremities, SILT, PERRLA, no nystagmus, steady gait, -Rhomberg's  SKIN: warm, normal color, well perfused, no rash  PSYCH: normal affect

## 2023-10-03 NOTE — ED STATDOCS - NS ED ATTENDING STATEMENT MOD
This was a shared visit with the ARELI. I reviewed and verified the documentation and independently performed the documented:

## 2023-10-03 NOTE — ED STATDOCS - OBJECTIVE STATEMENT
79 y/o female with PMHx of HTN, HLD, GERD presents to ED c/o sudden onset near syncope immediately after finishing a meal approx. 1 hour ago. States she developed abdominal cramping and acute on chronic diarrhea, when she felt diaphoretic associated with pain behind bilateral ears and jaw. Reports episode lasted for appox. 15-20 minutes which then completely resolved. Pt had similar episode 2 years ago, went to the ED the next day and had work up done with allergist with no significant findings. Pt completed Z-Wing treatment for bronchitis a couple of days ago. No urinary complications. No blood in stool noted.

## 2023-10-03 NOTE — ED STATDOCS - CLINICAL SUMMARY MEDICAL DECISION MAKING FREE TEXT BOX
78-year-old female presents to the emergency department for multiple symptoms.  Patient states that she had just finished eating hot dogs approximately 1.5 hours ago when she developed a 15-minute episode of posterior head and jaw pain, dizziness, had near syncopal episode, and diarrhea.  Symptoms have spontaneously resolved since then.  She is neurovascularly intact, abdomen soft nontender nondistended, heart and lungs normal.  Will evaluate for cause of syncope, blood work, chest x-ray, IV fluids, reassess for dispo.

## 2023-10-03 NOTE — ED ADULT NURSE NOTE - OBJECTIVE STATEMENT
Pt ambulatory to ED c/o sudden onset near syncope immediately after finishing a meal approx. 1 hour ago. Pt reports abdominal cramping and chronic diarrhea Reports episode lasted for appox. 15-20 minutes which then completely resolved. Denies CP, SOB at this time. PIv obtained, labs sent, EKG completed pt ordered for cardiac monitor. Xrays pending.  Plan of care explained with verbalized understanding.

## 2023-10-03 NOTE — ED STATDOCS - PATIENT PORTAL LINK FT
You can access the FollowMyHealth Patient Portal offered by [facility] by registering at the following website: [email or web address]. By joining [employer]’s FollowMyHealth portal, you will also be able to view your health information using other applications (apps) compatible with our system.

## 2023-10-03 NOTE — ED ADULT TRIAGE NOTE - CHIEF COMPLAINT QUOTE
pt c/o abdominal cramping and diarrhea, then I all of a sudden became hot and sweaty and felt like I was going to faint. then it went away. I drove here to make sure im ok". denies cp/sob/fever/chills. I just finished my abx for bronchitis. pmh : bronchitis.

## 2023-10-03 NOTE — ED STATDOCS - PROGRESS NOTE DETAILS
79 y/o female with PMHx of HTN, HLD, GERD presents to ED c/o sudden onset near syncope immediately after finishing a meal approx. 1 hour ago. States she developed abdominal cramping and acute on chronic diarrhea, when she felt diaphoretic associated with pain behind bilateral ears and jaw. Reports episode lasted for appox. 15-20 minutes which then completely resolved. Pt had similar episode 2 years ago, went to the ED the next day and had work up done with allergist with no significant findings. Pt completed Z-Wing treatment for bronchitis a couple of days ago. No urinary complications. No blood in stool noted. PA note: All labwork/radiology results discussed in detail with patient. Patient re-examined and re-evaluated. Patient feels much better at this time. ED evaluation, Diagnosis and management discussed with the patient in detail. Workup results discussed with ED attending, OK to [place] home. Close PMD follow up encouraged, aftercare to assist with scheduling appointment ASAP. Strict ED return instructions discussed in detail and patient given the opportunity to ask any questions about their discharge diagnosis and instructions. Patient verbalized understanding. ~ Kristian Lee PA-C

## 2023-10-05 LAB
CULTURE RESULTS: SIGNIFICANT CHANGE UP
SPECIMEN SOURCE: SIGNIFICANT CHANGE UP